# Patient Record
Sex: MALE | Race: WHITE | Employment: UNEMPLOYED | URBAN - METROPOLITAN AREA
[De-identification: names, ages, dates, MRNs, and addresses within clinical notes are randomized per-mention and may not be internally consistent; named-entity substitution may affect disease eponyms.]

---

## 2019-04-04 ENCOUNTER — HOSPITAL ENCOUNTER (OUTPATIENT)
Age: 59
Setting detail: OBSERVATION
Discharge: HOME | End: 2019-04-07
Attending: EMERGENCY MEDICINE | Admitting: HOSPITALIST
Payer: SELF-PAY

## 2019-04-04 ENCOUNTER — APPOINTMENT (OUTPATIENT)
Dept: CT IMAGING | Age: 59
End: 2019-04-04
Attending: EMERGENCY MEDICINE
Payer: SELF-PAY

## 2019-04-04 DIAGNOSIS — G93.40 ACUTE ENCEPHALOPATHY: Primary | ICD-10-CM

## 2019-04-04 LAB
ALBUMIN SERPL-MCNC: 3.1 G/DL (ref 3.5–5)
ALBUMIN/GLOB SERPL: 1 {RATIO} (ref 1.1–2.2)
ALP SERPL-CCNC: 52 U/L (ref 45–117)
ALT SERPL-CCNC: 16 U/L (ref 12–78)
ANION GAP SERPL CALC-SCNC: 6 MMOL/L (ref 5–15)
AST SERPL-CCNC: 16 U/L (ref 15–37)
BASOPHILS # BLD: 0 K/UL (ref 0–0.1)
BASOPHILS NFR BLD: 0 % (ref 0–1)
BILIRUB SERPL-MCNC: 0.3 MG/DL (ref 0.2–1)
BUN SERPL-MCNC: 22 MG/DL (ref 6–20)
BUN/CREAT SERPL: 15 (ref 12–20)
CALCIUM SERPL-MCNC: 8.3 MG/DL (ref 8.5–10.1)
CHLORIDE SERPL-SCNC: 102 MMOL/L (ref 97–108)
CK MB CFR SERPL CALC: ABNORMAL % (ref 0–2.5)
CK MB SERPL-MCNC: <1 NG/ML (ref 5–25)
CK SERPL-CCNC: 31 U/L (ref 39–308)
CO2 SERPL-SCNC: 28 MMOL/L (ref 21–32)
CREAT SERPL-MCNC: 1.51 MG/DL (ref 0.7–1.3)
DIFFERENTIAL METHOD BLD: NORMAL
EOSINOPHIL # BLD: 0.1 K/UL (ref 0–0.4)
EOSINOPHIL NFR BLD: 1 % (ref 0–7)
ERYTHROCYTE [DISTWIDTH] IN BLOOD BY AUTOMATED COUNT: 13 % (ref 11.5–14.5)
GLOBULIN SER CALC-MCNC: 3.2 G/DL (ref 2–4)
GLUCOSE SERPL-MCNC: 101 MG/DL (ref 65–100)
HCT VFR BLD AUTO: 43.9 % (ref 36.6–50.3)
HGB BLD-MCNC: 14.8 G/DL (ref 12.1–17)
IMM GRANULOCYTES # BLD AUTO: 0 K/UL (ref 0–0.04)
IMM GRANULOCYTES NFR BLD AUTO: 0 % (ref 0–0.5)
LYMPHOCYTES # BLD: 1.6 K/UL (ref 0.8–3.5)
LYMPHOCYTES NFR BLD: 19 % (ref 12–49)
MCH RBC QN AUTO: 32.1 PG (ref 26–34)
MCHC RBC AUTO-ENTMCNC: 33.7 G/DL (ref 30–36.5)
MCV RBC AUTO: 95.2 FL (ref 80–99)
MONOCYTES # BLD: 0.7 K/UL (ref 0–1)
MONOCYTES NFR BLD: 8 % (ref 5–13)
NEUTS SEG # BLD: 6.3 K/UL (ref 1.8–8)
NEUTS SEG NFR BLD: 72 % (ref 32–75)
NRBC # BLD: 0 K/UL (ref 0–0.01)
NRBC BLD-RTO: 0 PER 100 WBC
PLATELET # BLD AUTO: 189 K/UL (ref 150–400)
PMV BLD AUTO: 9.7 FL (ref 8.9–12.9)
POTASSIUM SERPL-SCNC: 3 MMOL/L (ref 3.5–5.1)
PROT SERPL-MCNC: 6.3 G/DL (ref 6.4–8.2)
RBC # BLD AUTO: 4.61 M/UL (ref 4.1–5.7)
SODIUM SERPL-SCNC: 136 MMOL/L (ref 136–145)
TROPONIN I SERPL-MCNC: <0.05 NG/ML
TSH SERPL DL<=0.05 MIU/L-ACNC: 0.85 UIU/ML (ref 0.36–3.74)
WBC # BLD AUTO: 8.6 K/UL (ref 4.1–11.1)

## 2019-04-04 PROCEDURE — 74011250636 HC RX REV CODE- 250/636: Performed by: EMERGENCY MEDICINE

## 2019-04-04 PROCEDURE — 83735 ASSAY OF MAGNESIUM: CPT

## 2019-04-04 PROCEDURE — 70450 CT HEAD/BRAIN W/O DYE: CPT

## 2019-04-04 PROCEDURE — 99284 EMERGENCY DEPT VISIT MOD MDM: CPT

## 2019-04-04 PROCEDURE — 96375 TX/PRO/DX INJ NEW DRUG ADDON: CPT

## 2019-04-04 PROCEDURE — 80053 COMPREHEN METABOLIC PANEL: CPT

## 2019-04-04 PROCEDURE — 82550 ASSAY OF CK (CPK): CPT

## 2019-04-04 PROCEDURE — 96374 THER/PROPH/DIAG INJ IV PUSH: CPT

## 2019-04-04 PROCEDURE — 99285 EMERGENCY DEPT VISIT HI MDM: CPT

## 2019-04-04 PROCEDURE — 85025 COMPLETE CBC W/AUTO DIFF WBC: CPT

## 2019-04-04 PROCEDURE — 74011250637 HC RX REV CODE- 250/637: Performed by: EMERGENCY MEDICINE

## 2019-04-04 PROCEDURE — 96361 HYDRATE IV INFUSION ADD-ON: CPT

## 2019-04-04 PROCEDURE — 84484 ASSAY OF TROPONIN QUANT: CPT

## 2019-04-04 PROCEDURE — 36415 COLL VENOUS BLD VENIPUNCTURE: CPT

## 2019-04-04 PROCEDURE — 84443 ASSAY THYROID STIM HORMONE: CPT

## 2019-04-04 RX ORDER — POTASSIUM CHLORIDE 750 MG/1
40 TABLET, FILM COATED, EXTENDED RELEASE ORAL
Status: COMPLETED | OUTPATIENT
Start: 2019-04-04 | End: 2019-04-04

## 2019-04-04 RX ORDER — DEXAMETHASONE SODIUM PHOSPHATE 4 MG/ML
10 INJECTION, SOLUTION INTRA-ARTICULAR; INTRALESIONAL; INTRAMUSCULAR; INTRAVENOUS; SOFT TISSUE
Status: COMPLETED | OUTPATIENT
Start: 2019-04-04 | End: 2019-04-04

## 2019-04-04 RX ORDER — SODIUM CHLORIDE 0.9 % (FLUSH) 0.9 %
5-40 SYRINGE (ML) INJECTION EVERY 8 HOURS
Status: DISCONTINUED | OUTPATIENT
Start: 2019-04-04 | End: 2019-04-07 | Stop reason: HOSPADM

## 2019-04-04 RX ORDER — ACETAMINOPHEN 500 MG
1000 TABLET ORAL ONCE
Status: COMPLETED | OUTPATIENT
Start: 2019-04-04 | End: 2019-04-04

## 2019-04-04 RX ORDER — SODIUM CHLORIDE 0.9 % (FLUSH) 0.9 %
5-40 SYRINGE (ML) INJECTION AS NEEDED
Status: DISCONTINUED | OUTPATIENT
Start: 2019-04-04 | End: 2019-04-07 | Stop reason: HOSPADM

## 2019-04-04 RX ORDER — METOCLOPRAMIDE HYDROCHLORIDE 5 MG/ML
10 INJECTION INTRAMUSCULAR; INTRAVENOUS
Status: COMPLETED | OUTPATIENT
Start: 2019-04-04 | End: 2019-04-04

## 2019-04-04 RX ADMIN — Medication 10 ML: at 22:00

## 2019-04-04 RX ADMIN — SODIUM CHLORIDE 1000 ML: 900 INJECTION, SOLUTION INTRAVENOUS at 23:17

## 2019-04-04 RX ADMIN — SODIUM CHLORIDE 1000 ML: 900 INJECTION, SOLUTION INTRAVENOUS at 20:54

## 2019-04-04 RX ADMIN — METOCLOPRAMIDE 10 MG: 5 INJECTION, SOLUTION INTRAMUSCULAR; INTRAVENOUS at 20:52

## 2019-04-04 RX ADMIN — ACETAMINOPHEN 1000 MG: 500 TABLET ORAL at 23:14

## 2019-04-04 RX ADMIN — POTASSIUM CHLORIDE 40 MEQ: 750 TABLET, EXTENDED RELEASE ORAL at 23:14

## 2019-04-04 RX ADMIN — DEXAMETHASONE SODIUM PHOSPHATE 10 MG: 4 INJECTION, SOLUTION INTRA-ARTICULAR; INTRALESIONAL; INTRAMUSCULAR; INTRAVENOUS; SOFT TISSUE at 20:56

## 2019-04-05 ENCOUNTER — APPOINTMENT (OUTPATIENT)
Dept: MRI IMAGING | Age: 59
End: 2019-04-05
Attending: HOSPITALIST
Payer: SELF-PAY

## 2019-04-05 PROBLEM — R51.9 HEADACHE: Status: ACTIVE | Noted: 2019-04-05

## 2019-04-05 PROBLEM — R42 DIZZINESS: Status: ACTIVE | Noted: 2019-04-05

## 2019-04-05 LAB
AMPHET UR QL SCN: NEGATIVE
ANION GAP SERPL CALC-SCNC: 4 MMOL/L (ref 5–15)
APPEARANCE UR: CLEAR
BACTERIA URNS QL MICRO: NEGATIVE /HPF
BARBITURATES UR QL SCN: NEGATIVE
BENZODIAZ UR QL: POSITIVE
BILIRUB UR QL: NEGATIVE
BUN SERPL-MCNC: 21 MG/DL (ref 6–20)
BUN/CREAT SERPL: 19 (ref 12–20)
CALCIUM SERPL-MCNC: 8.5 MG/DL (ref 8.5–10.1)
CANNABINOIDS UR QL SCN: POSITIVE
CHLORIDE SERPL-SCNC: 112 MMOL/L (ref 97–108)
CO2 SERPL-SCNC: 24 MMOL/L (ref 21–32)
COCAINE UR QL SCN: NEGATIVE
COLOR UR: ABNORMAL
CREAT SERPL-MCNC: 1.08 MG/DL (ref 0.7–1.3)
DRUG SCRN COMMENT,DRGCM: ABNORMAL
EPITH CASTS URNS QL MICRO: ABNORMAL /LPF
GLUCOSE SERPL-MCNC: 124 MG/DL (ref 65–100)
GLUCOSE UR STRIP.AUTO-MCNC: NEGATIVE MG/DL
HGB UR QL STRIP: NEGATIVE
KETONES UR QL STRIP.AUTO: ABNORMAL MG/DL
LEUKOCYTE ESTERASE UR QL STRIP.AUTO: NEGATIVE
MAGNESIUM SERPL-MCNC: 2 MG/DL (ref 1.6–2.4)
METHADONE UR QL: NEGATIVE
NITRITE UR QL STRIP.AUTO: NEGATIVE
OPIATES UR QL: NEGATIVE
PCP UR QL: NEGATIVE
PH UR STRIP: 7 [PH] (ref 5–8)
POTASSIUM SERPL-SCNC: 5.2 MMOL/L (ref 3.5–5.1)
PROT UR STRIP-MCNC: NEGATIVE MG/DL
RBC #/AREA URNS HPF: ABNORMAL /HPF (ref 0–5)
SODIUM SERPL-SCNC: 140 MMOL/L (ref 136–145)
SP GR UR REFRACTOMETRY: 1.01 (ref 1–1.03)
UA: UC IF INDICATED,UAUC: ABNORMAL
UROBILINOGEN UR QL STRIP.AUTO: 0.2 EU/DL (ref 0.2–1)
WBC URNS QL MICRO: ABNORMAL /HPF (ref 0–4)

## 2019-04-05 PROCEDURE — 96376 TX/PRO/DX INJ SAME DRUG ADON: CPT

## 2019-04-05 PROCEDURE — 97162 PT EVAL MOD COMPLEX 30 MIN: CPT

## 2019-04-05 PROCEDURE — 74011250636 HC RX REV CODE- 250/636: Performed by: HOSPITALIST

## 2019-04-05 PROCEDURE — 80307 DRUG TEST PRSMV CHEM ANLYZR: CPT

## 2019-04-05 PROCEDURE — 74011000250 HC RX REV CODE- 250: Performed by: HOSPITALIST

## 2019-04-05 PROCEDURE — 97116 GAIT TRAINING THERAPY: CPT

## 2019-04-05 PROCEDURE — 81001 URINALYSIS AUTO W/SCOPE: CPT

## 2019-04-05 PROCEDURE — 74011250637 HC RX REV CODE- 250/637: Performed by: HOSPITALIST

## 2019-04-05 PROCEDURE — 99218 HC RM OBSERVATION: CPT

## 2019-04-05 PROCEDURE — 36415 COLL VENOUS BLD VENIPUNCTURE: CPT

## 2019-04-05 PROCEDURE — A9575 INJ GADOTERATE MEGLUMI 0.1ML: HCPCS | Performed by: HOSPITALIST

## 2019-04-05 PROCEDURE — 97112 NEUROMUSCULAR REEDUCATION: CPT

## 2019-04-05 PROCEDURE — 97165 OT EVAL LOW COMPLEX 30 MIN: CPT

## 2019-04-05 PROCEDURE — 80048 BASIC METABOLIC PNL TOTAL CA: CPT

## 2019-04-05 PROCEDURE — 96375 TX/PRO/DX INJ NEW DRUG ADDON: CPT

## 2019-04-05 PROCEDURE — 70553 MRI BRAIN STEM W/O & W/DYE: CPT

## 2019-04-05 RX ORDER — POTASSIUM CHLORIDE AND SODIUM CHLORIDE 900; 300 MG/100ML; MG/100ML
INJECTION, SOLUTION INTRAVENOUS CONTINUOUS
Status: DISCONTINUED | OUTPATIENT
Start: 2019-04-05 | End: 2019-04-05

## 2019-04-05 RX ORDER — DIPHENHYDRAMINE HYDROCHLORIDE 50 MG/ML
25 INJECTION, SOLUTION INTRAMUSCULAR; INTRAVENOUS ONCE
Status: COMPLETED | OUTPATIENT
Start: 2019-04-05 | End: 2019-04-05

## 2019-04-05 RX ORDER — GADOTERATE MEGLUMINE 376.9 MG/ML
12 INJECTION INTRAVENOUS
Status: COMPLETED | OUTPATIENT
Start: 2019-04-05 | End: 2019-04-05

## 2019-04-05 RX ORDER — METOCLOPRAMIDE HYDROCHLORIDE 5 MG/ML
10 INJECTION INTRAMUSCULAR; INTRAVENOUS
Status: COMPLETED | OUTPATIENT
Start: 2019-04-05 | End: 2019-04-05

## 2019-04-05 RX ORDER — DIAZEPAM 5 MG/1
5 TABLET ORAL
COMMUNITY

## 2019-04-05 RX ORDER — QUETIAPINE FUMARATE 50 MG/1
50 TABLET, FILM COATED ORAL
COMMUNITY

## 2019-04-05 RX ORDER — ASPIRIN 325 MG/1
100 TABLET, FILM COATED ORAL DAILY
Status: DISCONTINUED | OUTPATIENT
Start: 2019-04-06 | End: 2019-04-07 | Stop reason: HOSPADM

## 2019-04-05 RX ORDER — AMOXICILLIN 250 MG/1
500 CAPSULE ORAL EVERY 8 HOURS
Status: DISCONTINUED | OUTPATIENT
Start: 2019-04-05 | End: 2019-04-07 | Stop reason: HOSPADM

## 2019-04-05 RX ORDER — LISINOPRIL 20 MG/1
20 TABLET ORAL DAILY
COMMUNITY
End: 2019-04-07

## 2019-04-05 RX ORDER — AMOXICILLIN 875 MG/1
875 TABLET, FILM COATED ORAL 2 TIMES DAILY
COMMUNITY

## 2019-04-05 RX ORDER — ENOXAPARIN SODIUM 100 MG/ML
40 INJECTION SUBCUTANEOUS EVERY 24 HOURS
Status: DISCONTINUED | OUTPATIENT
Start: 2019-04-05 | End: 2019-04-05

## 2019-04-05 RX ORDER — BISMUTH SUBSALICYLATE 262 MG
1 TABLET,CHEWABLE ORAL DAILY
COMMUNITY

## 2019-04-05 RX ORDER — HEPARIN SODIUM 5000 [USP'U]/ML
5000 INJECTION, SOLUTION INTRAVENOUS; SUBCUTANEOUS EVERY 12 HOURS
Status: DISCONTINUED | OUTPATIENT
Start: 2019-04-05 | End: 2019-04-07 | Stop reason: HOSPADM

## 2019-04-05 RX ORDER — ONDANSETRON 2 MG/ML
4 INJECTION INTRAMUSCULAR; INTRAVENOUS
Status: DISCONTINUED | OUTPATIENT
Start: 2019-04-05 | End: 2019-04-07 | Stop reason: HOSPADM

## 2019-04-05 RX ORDER — SODIUM CHLORIDE 0.9 % (FLUSH) 0.9 %
5-40 SYRINGE (ML) INJECTION EVERY 8 HOURS
Status: DISCONTINUED | OUTPATIENT
Start: 2019-04-05 | End: 2019-04-07 | Stop reason: HOSPADM

## 2019-04-05 RX ORDER — ACETAMINOPHEN 325 MG/1
650 TABLET ORAL
Status: DISCONTINUED | OUTPATIENT
Start: 2019-04-05 | End: 2019-04-06

## 2019-04-05 RX ORDER — METOPROLOL TARTRATE 25 MG/1
25 TABLET, FILM COATED ORAL EVERY 12 HOURS
Status: DISCONTINUED | OUTPATIENT
Start: 2019-04-05 | End: 2019-04-06

## 2019-04-05 RX ORDER — QUETIAPINE FUMARATE 25 MG/1
50 TABLET, FILM COATED ORAL
Status: DISCONTINUED | OUTPATIENT
Start: 2019-04-05 | End: 2019-04-07 | Stop reason: HOSPADM

## 2019-04-05 RX ORDER — MECLIZINE HCL 12.5 MG 12.5 MG/1
25 TABLET ORAL
Status: DISCONTINUED | OUTPATIENT
Start: 2019-04-05 | End: 2019-04-07 | Stop reason: HOSPADM

## 2019-04-05 RX ORDER — LABETALOL HCL 20 MG/4 ML
10 SYRINGE (ML) INTRAVENOUS
Status: DISCONTINUED | OUTPATIENT
Start: 2019-04-05 | End: 2019-04-05

## 2019-04-05 RX ORDER — POTASSIUM CHLORIDE 1.5 G/1.77G
40 POWDER, FOR SOLUTION ORAL
Status: COMPLETED | OUTPATIENT
Start: 2019-04-05 | End: 2019-04-05

## 2019-04-05 RX ORDER — HYDRALAZINE HYDROCHLORIDE 20 MG/ML
20 INJECTION INTRAMUSCULAR; INTRAVENOUS
Status: DISCONTINUED | OUTPATIENT
Start: 2019-04-05 | End: 2019-04-06

## 2019-04-05 RX ORDER — SODIUM CHLORIDE 0.9 % (FLUSH) 0.9 %
5-40 SYRINGE (ML) INJECTION AS NEEDED
Status: DISCONTINUED | OUTPATIENT
Start: 2019-04-05 | End: 2019-04-07 | Stop reason: HOSPADM

## 2019-04-05 RX ORDER — SODIUM CHLORIDE 9 MG/ML
75 INJECTION, SOLUTION INTRAVENOUS CONTINUOUS
Status: DISPENSED | OUTPATIENT
Start: 2019-04-05 | End: 2019-04-06

## 2019-04-05 RX ADMIN — Medication 10 ML: at 05:09

## 2019-04-05 RX ADMIN — HEPARIN SODIUM 5000 UNITS: 5000 INJECTION INTRAVENOUS; SUBCUTANEOUS at 21:42

## 2019-04-05 RX ADMIN — AMOXICILLIN 500 MG: 250 CAPSULE ORAL at 16:59

## 2019-04-05 RX ADMIN — Medication 10 ML: at 05:10

## 2019-04-05 RX ADMIN — GADOTERATE MEGLUMINE 12 ML: 376.9 INJECTION INTRAVENOUS at 19:00

## 2019-04-05 RX ADMIN — Medication 10 ML: at 15:58

## 2019-04-05 RX ADMIN — SODIUM CHLORIDE 100 ML/HR: 900 INJECTION, SOLUTION INTRAVENOUS at 16:00

## 2019-04-05 RX ADMIN — Medication 10 ML: at 19:50

## 2019-04-05 RX ADMIN — POTASSIUM CHLORIDE 40 MEQ: 1.5 POWDER, FOR SOLUTION ORAL at 03:47

## 2019-04-05 RX ADMIN — FOLIC ACID: 5 INJECTION, SOLUTION INTRAMUSCULAR; INTRAVENOUS; SUBCUTANEOUS at 02:54

## 2019-04-05 RX ADMIN — METOCLOPRAMIDE 10 MG: 5 INJECTION, SOLUTION INTRAMUSCULAR; INTRAVENOUS at 02:58

## 2019-04-05 RX ADMIN — QUETIAPINE FUMARATE 50 MG: 25 TABLET ORAL at 21:41

## 2019-04-05 RX ADMIN — ACETAMINOPHEN 650 MG: 325 TABLET ORAL at 08:21

## 2019-04-05 RX ADMIN — SODIUM CHLORIDE AND POTASSIUM CHLORIDE: 9; 2.98 INJECTION, SOLUTION INTRAVENOUS at 11:37

## 2019-04-05 RX ADMIN — Medication 10 ML: at 21:43

## 2019-04-05 RX ADMIN — Medication 10 ML: at 15:59

## 2019-04-05 RX ADMIN — SODIUM CHLORIDE AND POTASSIUM CHLORIDE: 9; 2.98 INJECTION, SOLUTION INTRAVENOUS at 03:49

## 2019-04-05 RX ADMIN — HEPARIN SODIUM 5000 UNITS: 5000 INJECTION INTRAVENOUS; SUBCUTANEOUS at 08:20

## 2019-04-05 RX ADMIN — AMOXICILLIN 500 MG: 250 CAPSULE ORAL at 21:46

## 2019-04-05 RX ADMIN — ACETAMINOPHEN 650 MG: 325 TABLET ORAL at 04:45

## 2019-04-05 RX ADMIN — DIPHENHYDRAMINE HYDROCHLORIDE 25 MG: 50 INJECTION, SOLUTION INTRAMUSCULAR; INTRAVENOUS at 01:55

## 2019-04-05 RX ADMIN — METOPROLOL TARTRATE 25 MG: 25 TABLET ORAL at 21:41

## 2019-04-05 RX ADMIN — ACETAMINOPHEN 650 MG: 325 TABLET ORAL at 21:42

## 2019-04-05 RX ADMIN — LABETALOL HYDROCHLORIDE 10 MG: 5 INJECTION, SOLUTION INTRAVENOUS at 15:58

## 2019-04-05 RX ADMIN — HYDRALAZINE HYDROCHLORIDE 20 MG: 20 INJECTION INTRAMUSCULAR; INTRAVENOUS at 19:50
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
## 2019-04-05 NOTE — ROUTINE PROCESS
Jovana Tinoco RN Registered Nurse Routine Process Signed Date of Service:  04/05/19 1940 []Hide copied text []Renetta for details Bedside and Verbal shift change report given to Gama Avendano (oncoming nurse) by Trudee Cheadle, RN (offgoing nurse). Report included the following information SBAR, Kardex, ED Summary, MAR and Recent Results. 
  
Zone Phone:   [phone]    
  
  
Significant changes during shift:  Elevated BP (given Labetalol), and MRI completed.  
  
  
  
Patient Information 
  
Haylee Davidson 
66 y.o. 
4/4/2019  8:24 PM by Isis White MD. Haylee Davidson was admitted from Home 
  
Problem List 
  
    
Patient Active Problem List  
  Diagnosis Date Noted  Dizziness 04/05/2019  
 Headache 04/05/2019  
  
No past medical history on file. Bipolar, HTN,  
  
  
Activity Status: 
  
OOB to Chair Yes Ambulated this shift Yes Bed Rest Yes 
  
  
LINES AND DRAINS: 20G R/L AC 
  
  
  
DVT prophylaxis:  Heparin 
  
DVT prophylaxis Med- Heparin DVT prophylaxis SCD or TAN- No  
  
Wounds: (If Applicable) 
  
Wounds- No 
  
Patient Safety: 
  
Falls Score Total Score: 2 Safety Level_______ Bed Alarm On? No 
Sitter? No 
  
Plan for upcoming shift: Monitor BP 
  
  
  
Discharge Plan: Yes / Possible for 4/6/19 if bp is controlled. Plan would be home with H/H and PT. 
  
Active Consults: 
None

## 2019-04-05 NOTE — ED NOTES
Pt's brother, Toni Quiñonez, [phone] if anything is needed. He will call in the morning for update.
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
## 2019-04-05 NOTE — ROUTINE PROCESS
TRANSFER - IN REPORT: 
 
Verbal report received from Nichelle(name) jan Chow  being received from ED(unit) for routine progression of care Report consisted of patients Situation, Background, Assessment and  
Recommendations(SBAR). Information from the following report(s) SBAR, Kardex, ED Summary, [facility] and Recent Results was reviewed with the receiving nurse. Opportunity for questions and clarification was provided. Assessment completed upon patients arrival to unit and care assumed.

## 2019-04-05 NOTE — PROGRESS NOTES
Pt seen and examined. Brother and SUZAN at bed side. Plan of care discussed with patient and family. Subjective Symptoms improved since admission, but have not completely resolved. Pt says he has been having blurry vision along with dizziness Objective /88, P 98 RR 18 sats 100% RA Not orthostatic Plan 
-Pt was diagnosed with ear infection and on amoxicillin for 2 days, will restart 
-will do MRI head to r/o stroke 
-had JOSE on admission, in setting of recently started lisinopril, will hold ACE and started pt on lopressor ( doesn't have insurance, medication affordability can be an issue) -UDS positive for THC, benzodiazepine( on prescription valium?) 
-resume seroquel PTA med 
-had hypokalemia on admission and repeat potassium 5.2 (d/marizol fluid with potassium).  Recheck lytes in am 
 
Rest Agree with A/P as stated in HPI

## 2019-04-05 NOTE — ED NOTES
Forensic nurse Kris Claudio will be coming to [facility] ED to speak with patient.
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
## 2019-04-05 NOTE — ROUTINE PROCESS
Bedside and Verbal shift change report given to Bettye (oncoming nurse) by Nasrin Pearson RN (offgoing nurse). Report included the following information SBAR, Kardex, ED Summary, [facility] and Recent Results. Zone Phone:   [phone] Significant changes during shift:  Admit to [facility] Patient Information Chary Yañez 
66 y.o. 
4/4/2019  8:24 PM by Esther Snyder MD. Chary Yañez was admitted from Home 
 
Problem List 
 
Patient Active Problem List  
 Diagnosis Date Noted  Dizziness 04/05/2019  
 Headache 04/05/2019 No past medical history on file. Bipolar, HTN, Activity Status: OOB to Chair Yes Ambulated this shift Yes Bed Rest Yes LINES AND DRAINS: 
  
 
DVT prophylaxis: DVT prophylaxis Med- No 
DVT prophylaxis SCD or TAN- No  
 
Wounds: (If Applicable) Wounds- No 
 
Patient Safety: 
 
Falls Score Total Score: 2 Safety Level_______ Bed Alarm On? No 
Sitter? No 
 
Plan for upcoming shift:  
 
 
Discharge Plan: Yes TBD Active Consults: 
None

## 2019-04-05 NOTE — ROUTINE PROCESS
-Please complete MRI History and Safety Screening Form for this patient using KARDEX only under Orders Requiring a Screening Form: 
 

## 2019-04-05 NOTE — ED NOTES
TRANSFER - OUT REPORT: 
 
Verbal report given to France Leyva RN(name) on Ankita Fischer  being transferred to [facility](unit) for routine progression of care Report consisted of patients Situation, Background, Assessment and  
Recommendations(SBAR). Information from the following report(s) SBAR, ED Summary, [facility] and Recent Results was reviewed with the receiving nurse. Lines:  
Peripheral IV 04/04/19 Left Antecubital (Active) Site Assessment Clean, dry, & intact 4/4/2019  8:35 PM  
Phlebitis Assessment 0 4/4/2019  8:35 PM  
Infiltration Assessment 0 4/4/2019  8:35 PM  
Dressing Status Clean, dry, & intact 4/4/2019  8:35 PM  
Dressing Type Transparent 4/4/2019  8:35 PM  
Hub Color/Line Status Pink 4/4/2019  8:35 PM  
  
 
Opportunity for questions and clarification was provided. Patient transported with: 
 [email or web address]

## 2019-04-05 NOTE — PROGRESS NOTES
Orders received, chart reviewed and patient evaluated by occupational therapy. Recommend patient to discharge to home with home care PT pending progression with skilled acute occupational therapy. Recommend with nursing patient to complete as able in order to maintain strength, endurance and independence: OOB to chair 3x/day, ADLs with supervision/setup and mobilizing to the bathroom for toileting with 1 assist. Thank you for your assistance. Full evaluation to follow.

## 2019-04-05 NOTE — ED TRIAGE NOTES
Assumed care of pt. Pt states he thinks he was given too many blood pressure pills. EMS reported pt had BP of 77/50 when picked up, EMS gave 250 mL of NS. Pt complains of severe headache. Pt reports he has bipolar disorder and was off his meds until recently.
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
## 2019-04-05 NOTE — PROGRESS NOTES
Pharmacy  Heparin Monitoring Indication: DVT prophylaxis Current Dose: Enoxaparin 40 mg subcutaneously every 24 hours Creatinine Clearance (mL/min): 47.5 ml/min Labs: 
Recent Labs 04/04/19 2056 CREA 1.51* HGB 14.8  Wt Readings from Last 1 Encounters:  
04/04/19 56.7 kg (125 lb) Ht Readings from Last 1 Encounters:  
04/04/19 165.1 cm (65\") Impression/Plan:  
Change to heparin 5000 units subcutaneously every 12hr per protocol for low weight patients <60 kg Thanks, Moisés Cee, PHARMD

## 2019-04-05 NOTE — PROGRESS NOTES
Orders received, chart reviewed and patient evaluated by physical therapy. Recommend patient to discharge to home with HHPT pending progress with skilled acute care physical therapy. Recommend with nursing patient to complete as able in order to maintain strength, endurance and independence: OOB to chair 3x/day with 1 person and ambulating with SBA. Thank you for your assistance. Full evaluation to follow.
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
## 2019-04-05 NOTE — PROGRESS NOTES
Occupational Therapy EVALUATION/discharge Patient: Luan South (43 y.o. male) Date: 4/5/2019 Primary Diagnosis: Dizziness [R42] Precautions:   Fall ASSESSMENT:  
Based on the objective data described below, the patient presents close to his baseline. Pt stated that he was independent with ADLs and ILS and did not use AD. HE was cleared to be seen for therapy  And all VSS and orthostatics taken and BP was stable. Pt scored 66/66 on Fugl Ligia and he did have minimal tremor in right UE but stated that he did not notice and tremor appeared only when he was standing. Pt was independent with bed mobility, transfers, and ADLs and stated that he was feeling much better and more like baseline. Pt was left sitting up in chair with call bell with in reach. Recommend that pt return home and have home care PT. Further skilled acute occupational therapy is not indicated at this time. Discharge Recommendations: Home Health PT Further Equipment Recommendations for Discharge: tbd SUBJECTIVE:  
Patient stated I just was afraid that I was having a stroke so I came in. Britni Sutton OBJECTIVE DATA SUMMARY:  
HISTORY:  
No past medical history on file. No past surgical history on file. Prior Level of Function/Environment/Context: pt lives with room mate, and stated that he was independent with ADLs and ILS Expanded or extensive additional review of patient history:  
 
Home Situation Home Environment: Private residence # Steps to Enter: 5 Rails to Enter: Yes Hand Rails : Right One/Two Story Residence: One story Living Alone: No 
Support Systems: Friends \ neighbors Current DME Used/Available at Home: None Tub or Shower Type: Tub/Shower combination Hand dominance: Right EXAMINATION OF PERFORMANCE DEFICITS: 
Cognitive/Behavioral Status: 
  
  
 intact Skin: in good health Edema: none Hearing: Auditory Auditory Impairment: None Vision/Perceptual:   
    
    
    
 intact Range of Motion: 
 
AROM: Within functional limits PROM: Within functional limits Strength: 
 
Strength: Within functional limits Coordination: 
Coordination: Within functional limits Tone & Sensation: 
Tone: Normal 
Sensation: Intact Balance: 
Sitting: Intact Standing: Impaired Standing - Static: Good Standing - Dynamic : Fair Functional Mobility and Transfers for ADLs:  
 
Transfers: 
Sit to Stand: Independent Stand to Sit: Independent ADL Assessment: 
  
Pt is setup with ADLs in this setting. Functional Measure: 
Fugl-Murray Assessment of Motor Recovery after Stroke:  
 
Reflex Activity Flexors/Biceps/Fingers: Can be elicited Extensors/Triceps: Can be elicited Reflex Subtotal: 4 Volitional Movement Within Synergies Shoulder Retraction: Full Shoulder Elevation: Full Shoulder Abduction (90 degrees): Full Shoulder External Rotation: Full Elbow Flexion: Full Forearm Supination: Full Shoulder Adduction/Internal Rotation: Full Elbow Extension: Full Forearm Pronation: Full Subtotal: 18 
 
Volitional Movement Mixing Synergies Hand to Lumbar Spine: Full Shoulder Flexion (0-90 degrees): Full Pronation-Supination: Full Subtotal: 6 Volitional Movement With Little or No Synergy Shoulder Abduction (0-90 degrees): Full Shoulder Flexion ( degrees): Full Pronation/Supination: Full Subtotal : 6 Normal Reflex Activity Biceps, Triceps, Finger Flexors: Full Subtotal : 2 Upper Extremity Total  
Upper Extremity Total: 36 Wrist 
Stability at 15 Degree Dorsiflexion: Full Repeated Dorsiflexion/ Volar Flexion: Full Stability at 15 Degree Dorsiflexion: Full Repeated Dorsiflexion/ Volar Flexion: Full Circumduction: Full Wrist Total: 10 Hand Mass Flexion: Full Mass Extension: Full Grasp A: Full Grasp B: Full Grasp C: Full Grasp D: Full Grasp E: Full Hand Total: 14 
 
Coordination/Speed Tremor: None Dysmetria: None Time: <1s Coordination/Speed Total : 6 Total A-D Total A-D (Motor Function): 83/76 This is a reliable/valid measure of arm function after a neurological event. It has established value to characterize functional status and for measuring spontaneous and therapy-induced recovery; tests proximal and distal motor functions. Fugl-Murray Assessment  UE scores recorded between five and 30 days post neurologic event can be used to predict UE recovery at six months post neurologic event. Severe = 0-21 points Moderately Severe = 22-33 points Moderate = 34-47 points Mild = 48-66 points Bobbye Mcburney Matchar, D., BERYL Celis, & SHAINA Hernandez (1992). Measurement of motor recovery after stroke: Outcome assessment and sample size requirements. Stroke, 23, pp. 1735-7821.  
--------------------------------------------------------------------------------------------------------------------------------------------------------------------MCID: 
Stroke: Diana Le et al, 2001; n = 171; mean age 79 (6) years; assessed within 16 (12) days of stroke, Acute Stroke) FMA Motor Scores from Admission to Discharge 10 point increase in FMA Upper Extremity = 1.5 change in discharge FIM  
10 point increase in FMA Lower Extremity = 1.9 change in discharge FIM 
MDC:  
Stroke:  
Nani Harris et al, 2008, n = 14, mean age = 59.9 (14.6) years, assessed on average 14 (6.5) months post stroke, Chronic Stroke) FMA = 5.2 points for the Upper Extremity portion of the assessment Occupational Therapy Evaluation Charge Determination History Examination Decision-Making LOW Complexity : Brief history review  LOW Complexity : 1-3 performance deficits relating to physical, cognitive , or psychosocial skils that result in activity limitations and / or participation restrictions  LOW Complexity : No comorbidities that affect functional and no verbal or physical assistance needed to complete eval tasks Based on the above components, the patient evaluation is determined to be of the following complexity level: LOW Pain: 
Pain Scale 1: Numeric (0 - 10) Pain Intensity 1: 0 Pain Location 1: Head 
  
Pain Description 1: Aching;Dull Pain Intervention(s) 1: Medication (see MAR) Activity Tolerance:  
fair Please refer to the flowsheet for vital signs taken during this treatment. After treatment:  
[x]  Patient left in no apparent distress sitting up in chair 
[]  Patient left in no apparent distress in bed 
[x]  Call bell left within reach [x]  Nursing notified 
[]  Caregiver present [x]  Bed alarm activated COMMUNICATION/EDUCATION:  
Communication/Collaboration: 
[x]      Home safety education was provided and the patient/caregiver indicated understanding. [x]      Patient/family have participated as able and agree with findings and recommendations. []      Patient is unable to participate in plan of care at this time. Findings and recommendations were discussed with: Physical Therapist and Registered Nurse YO Cesar Time Calculation: 36 mins

## 2019-04-05 NOTE — PROGRESS NOTES
Pharmacy Clarification of the Prior to Admission Medication Regimen Retrospective to the Admission Medication Reconciliation The patient was interviewed regarding clarification of the prior to admission medication regimen and was questioned regarding use of any other inhalers, topical products, over the counter medications, herbal medications, vitamin products or ophthalmic/nasal/otic medication use. Information Obtained From: Patient Recommendations/Findings: The following amendments were made to the patient's active medication list on file at [facility]:  
 
1) Additions: QUEtiapine (SEROQUEL) 50 mg tablet 
amoxicillin (AMOXIL) 875 mg tablet 
diazePAM (VALIUM) 5 mg tablet 
docosahexanoic acid/epa (FISH OIL PO) 
lisinopril (PRINIVIL, ZESTRIL) 20 mg tablet 
multivitamin (ONE A DAY) tablet 2) Removals: None 3) Changes: None 4) Pertinent Pharmacy Findings: 
[id number] 
diazePAM (VALIUM) 5 mg tablet:  Patient stated in interview that he knows that this agent is prescribed daily but some days he will take 2 or 3 depending on his anxiety. Patient also stated in interview that he did smoke marijuana 2 to 3 times a day but stopped nearly a month ago, but some still \"might show up in my pee\". PTA medication list was corrected to the following:  
 
Prior to Admission Medications Prescriptions Last Dose Informant Patient Reported? Taking? QUEtiapine (SEROQUEL) 50 mg tablet 4/4/2019 at Unknown time Self Yes Yes Sig: Take 50 mg by mouth nightly. amoxicillin (AMOXIL) 875 mg tablet 4/4/2019 at Unknown time Self Yes Yes Sig: Take 875 mg by mouth two (2) times a day. diazePAM (VALIUM) 5 mg tablet 4/4/2019 at Unknown time Self Yes Yes Sig: Take 5 mg by mouth daily as needed for Anxiety. docosahexanoic acid/epa (FISH OIL PO) 4/4/2019 at Unknown time Self Yes Yes Sig: Take 1 Tab by mouth daily. lisinopril (PRINIVIL, ZESTRIL) 20 mg tablet 4/4/2019 at Unknown time Self Yes Yes Sig: Take 20 mg by mouth daily. multivitamin (ONE A DAY) tablet 4/4/2019 at Unknown time Self Yes Yes Sig: Take 1 Tab by mouth daily. Facility-Administered Medications: None Thank you, 
Talat Catching Medication History Pharmacy Technician

## 2019-04-05 NOTE — ED NOTES
Pt ambulated to restroom with family. CT notified that pt is back in room and ready to go to CT now.
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
## 2019-04-05 NOTE — PROGRESS NOTES
Reason for Admission:  Dizziness RRAT Score: 4 Plan for utilizing home health: Pt will need HH at discharge, however he is a self pay patient. He would need hh acceptance as a diandra pt. [facility] has denied due to psych hx. Current Advanced Directive/Advance Care Plan: No plan on file Likelihood of Readmission:  Low based on RRAT Transition of Care Plan:   CM met with the pt to discuss d/c planning needs. Pt brother and ex wife were at bedside. Pt rents a room in a 1 story home (5 stairs to entry). Pt states that prior to hospitalization he was independent with ADLs/IADLs. Pt states that he does not drive but can uber or rely on friends for transport. Pt is self pay and was provided with a [employer] application. PT/OT recommend HH at discharge. Referrals sent to [facility], which was denied and [facility] who accepted. CM has completd the d/c planning needs of the pt at this time. Care Management Interventions PCP Verified by CM: Yes Mode of Transport at Discharge: (Pt family will transport home ) Transition of Care Consult (CM Consult): Discharge Planning Physical Therapy Consult: Yes Occupational Therapy Consult: Yes Current Support Network: Other(Rents a room in a home ) Confirm Follow Up Transport: Family Plan discussed with Pt/Family/Caregiver: Yes Discharge Location Discharge Placement: Home with home health Nathanael Poon, Care Manager [phone]

## 2019-04-06 LAB
ANION GAP SERPL CALC-SCNC: 4 MMOL/L (ref 5–15)
BUN SERPL-MCNC: 20 MG/DL (ref 6–20)
BUN/CREAT SERPL: 24 (ref 12–20)
CALCIUM SERPL-MCNC: 8.2 MG/DL (ref 8.5–10.1)
CHLORIDE SERPL-SCNC: 113 MMOL/L (ref 97–108)
CHOLEST SERPL-MCNC: 145 MG/DL
CO2 SERPL-SCNC: 25 MMOL/L (ref 21–32)
CREAT SERPL-MCNC: 0.83 MG/DL (ref 0.7–1.3)
GLUCOSE SERPL-MCNC: 93 MG/DL (ref 65–100)
HDLC SERPL-MCNC: 49 MG/DL
HDLC SERPL: 3 {RATIO} (ref 0–5)
LDLC SERPL CALC-MCNC: 78.8 MG/DL (ref 0–100)
LIPID PROFILE,FLP: NORMAL
POTASSIUM SERPL-SCNC: 3.6 MMOL/L (ref 3.5–5.1)
SODIUM SERPL-SCNC: 142 MMOL/L (ref 136–145)
TRIGL SERPL-MCNC: 86 MG/DL (ref ?–150)
VLDLC SERPL CALC-MCNC: 17.2 MG/DL

## 2019-04-06 PROCEDURE — 36415 COLL VENOUS BLD VENIPUNCTURE: CPT

## 2019-04-06 PROCEDURE — 80061 LIPID PANEL: CPT

## 2019-04-06 PROCEDURE — 74011250636 HC RX REV CODE- 250/636: Performed by: INTERNAL MEDICINE

## 2019-04-06 PROCEDURE — 74011250637 HC RX REV CODE- 250/637: Performed by: HOSPITALIST

## 2019-04-06 PROCEDURE — 74011250636 HC RX REV CODE- 250/636: Performed by: HOSPITALIST

## 2019-04-06 PROCEDURE — 99218 HC RM OBSERVATION: CPT

## 2019-04-06 PROCEDURE — 80048 BASIC METABOLIC PNL TOTAL CA: CPT

## 2019-04-06 PROCEDURE — 74011250637 HC RX REV CODE- 250/637: Performed by: INTERNAL MEDICINE

## 2019-04-06 RX ORDER — HYDRALAZINE HYDROCHLORIDE 20 MG/ML
10 INJECTION INTRAMUSCULAR; INTRAVENOUS
Status: DISCONTINUED | OUTPATIENT
Start: 2019-04-06 | End: 2019-04-07 | Stop reason: HOSPADM

## 2019-04-06 RX ORDER — METOPROLOL TARTRATE 50 MG/1
50 TABLET ORAL EVERY 12 HOURS
Status: DISCONTINUED | OUTPATIENT
Start: 2019-04-06 | End: 2019-04-07 | Stop reason: HOSPADM

## 2019-04-06 RX ORDER — ACETAMINOPHEN 500 MG
500 TABLET ORAL
Status: DISCONTINUED | OUTPATIENT
Start: 2019-04-06 | End: 2019-04-07 | Stop reason: HOSPADM

## 2019-04-06 RX ORDER — HYDRALAZINE HYDROCHLORIDE 20 MG/ML
20 INJECTION INTRAMUSCULAR; INTRAVENOUS ONCE
Status: DISCONTINUED | OUTPATIENT
Start: 2019-04-06 | End: 2019-04-06

## 2019-04-06 RX ORDER — BUTALBITAL, ACETAMINOPHEN AND CAFFEINE 50; 325; 40 MG/1; MG/1; MG/1
1 TABLET ORAL
Status: DISCONTINUED | OUTPATIENT
Start: 2019-04-06 | End: 2019-04-07 | Stop reason: HOSPADM

## 2019-04-06 RX ADMIN — AMOXICILLIN 500 MG: 250 CAPSULE ORAL at 13:38

## 2019-04-06 RX ADMIN — METOPROLOL TARTRATE 25 MG: 25 TABLET ORAL at 08:22

## 2019-04-06 RX ADMIN — ACETAMINOPHEN 650 MG: 325 TABLET ORAL at 08:18

## 2019-04-06 RX ADMIN — AMOXICILLIN 500 MG: 250 CAPSULE ORAL at 08:18

## 2019-04-06 RX ADMIN — Medication 10 ML: at 22:06

## 2019-04-06 RX ADMIN — HEPARIN SODIUM 5000 UNITS: 5000 INJECTION INTRAVENOUS; SUBCUTANEOUS at 08:19

## 2019-04-06 RX ADMIN — AMOXICILLIN 500 MG: 250 CAPSULE ORAL at 22:06

## 2019-04-06 RX ADMIN — Medication 10 ML: at 13:38

## 2019-04-06 RX ADMIN — QUETIAPINE FUMARATE 50 MG: 25 TABLET ORAL at 22:06

## 2019-04-06 RX ADMIN — BUTALBITAL, ACETAMINOPHEN AND CAFFEINE 1 TABLET: 50; 325; 40 TABLET ORAL at 12:02

## 2019-04-06 RX ADMIN — BUTALBITAL, ACETAMINOPHEN AND CAFFEINE 1 TABLET: 50; 325; 40 TABLET ORAL at 18:03

## 2019-04-06 RX ADMIN — Medication 100 MG: at 08:18

## 2019-04-06 RX ADMIN — HYDRALAZINE HYDROCHLORIDE 10 MG: 20 INJECTION INTRAMUSCULAR; INTRAVENOUS at 16:09

## 2019-04-06 RX ADMIN — Medication 10 ML: at 16:10

## 2019-04-06 RX ADMIN — METOPROLOL TARTRATE 50 MG: 50 TABLET ORAL at 22:06

## 2019-04-06 NOTE — ROUTINE PROCESS
Bedside and Verbal shift change report given to Bettye (oncoming nurse) by Kimmie Calvin (offgoing nurse). Report included the following information SBAR, Kardex, ED Summary, [facility] and Recent Results. Zone Phone:   [phone] Significant changes during shift:  Admit to [facility] Patient Information Quinton Blue 
66 y.o. 
4/4/2019  8:24 PM by April Brothers MD. Quinton Blue was admitted from Home 
 
Problem List 
 
Patient Active Problem List  
 Diagnosis Date Noted  Dizziness 04/05/2019  
 Headache 04/05/2019 No past medical history on file. Bipolar, HTN, Activity Status: OOB to Chair Yes Ambulated this shift Yes Bed Rest Yes LINES AND DRAINS: 
  
 
DVT prophylaxis: DVT prophylaxis Med- No 
DVT prophylaxis SCD or TAN- No  
 
Wounds: (If Applicable) Wounds- No 
 
Patient Safety: 
 
Falls Score Total Score: 1 Safety Level_______ Bed Alarm On? No 
Sitter? No 
 
Plan for upcoming shift:  
 
 
Discharge Plan: Yes TBD Active Consults: 
None

## 2019-04-06 NOTE — PROGRESS NOTES
Hospitalist Progress Note NAME: Roxanna Khalil :  1960 MRN:  [id number] Assessment / Plan: 
Dizziness Headache 
-likely due to severe hypotension with SBP~70s on admission as per EMS 
-CT head neg for acute process   
-MRI with normal study 
-dizziness improved but has HA today, likely due to elevated BP 
-start Fioricet prn, tylenol prn. Control BP 
 
HTN,  was initially hypotensive 
-hold ACEi due to JOSE  
-stable on metoprolol, incr dose for better BP control JOSE 
-resolved with ACEI on hold 
-monitor bmp Hypokalemia 
-repleted, wnl today 
   
Bipolar disorder 
-cont' seroquel 
  
Unclear history of alcohol use 
-cont' thiamine 
-reports last drink was >4 days ago 
  
Marijuana abuse, counseled on cessation 
   
Ear infection 
-cont' PTA amoxicillin Code status: full DVT ppx: sq heparin Disposition: TBD Subjective: Chief Complaint / Reason for Physician Visit Pt seen in bed, c/o HA but denies dizziness or vision change. States his BP is causing the headache. Tylenol did not helped. Discussed with RN events overnight. Review of Systems: 
Symptom Y/N Comments  Symptom Y/N Comments Fever/Chills n   Chest Pain n   
Poor Appetite    Edema Cough    Abdominal Pain Sputum    Joint Pain SOB/MÉNDEZ n   Pruritis/Rash Nausea/vomit    Tolerating PT/OT Diarrhea    Tolerating Diet Constipation    Other y HA Could NOT obtain due to:   
 
Objective: VITALS:  
Last 24hrs VS reviewed since prior progress note. Most recent are: 
Patient Vitals for the past 24 hrs: 
 Temp Pulse Resp BP SpO2  
19 1057 97.8 °F (36.6 °C) 83 18 (!) 164/99 98 % 19 0634 98 °F (36.7 °C) 78 20 146/86 99 % 19 0309 97.4 °F (36.3 °C) 77 20 136/78 100 % 19 2255 98.1 °F (36.7 °C) 89 18 109/69 99 % 19 2141  (!) 110  (!) 145/93   
19 1938 97.9 °F (36.6 °C) 95 18 (!) 192/94 95 % 19 1543 97.5 °F (36.4 °C) 100 18 (!) 187/107 100 % No intake or output data in the 24 hours ending 04/06/19 1318 PHYSICAL EXAM: 
General: WD, WN. Alert, cooperative, no acute distress   
EENT:  EOMI. Anicteric sclerae. MMM Resp:  CTA bilaterally, no wheezing or rales. No accessory muscle use CV:  Regular  rhythm,  No edema GI:  Soft, Non distended, Non tender.  +Bowel sounds Neurologic:  Alert and oriented X 3, normal speech, Psych:   Good insight. Not anxious nor agitated Skin:  No rashes. No jaundice Reviewed most current lab test results and cultures  YES Reviewed most current radiology test results   YES Review and summation of old records today    NO Reviewed patient's current orders and MAR    YES 
PMH/SH reviewed - no change compared to H&P 
________________________________________________________________________ Care Plan discussed with: 
  Comments Patient x Family RN x Care Manager Consultant Multidiciplinary team rounds were held today with , nursing, pharmacist and clinical coordinator. Patient's plan of care was discussed; medications were reviewed and discharge planning was addressed. ________________________________________________________________________ Total NON critical care TIME:  35   Minutes Total CRITICAL CARE TIME Spent:   Minutes non procedure based Comments >50% of visit spent in counseling and coordination of care    
________________________________________________________________________ Elizabeth Lowe MD  
 
Procedures: see electronic medical records for all procedures/Xrays and details which were not copied into this note but were reviewed prior to creation of Plan. LABS: 
I reviewed today's most current labs and imaging studies. Pertinent labs include: 
Recent Labs 04/04/19 2056 WBC 8.6 HGB 14.8 HCT 43.9  Recent Labs 04/06/19 
0426 04/05/19 
1045 04/04/19 2056  140 136  
K 3.6 5.2* 3.0*  
* 112* 102 CO2 25 24 28 GLU 93 124* 101* BUN 20 21* 22* CREA 0.83 1.08 1.51* CA 8.2* 8.5 8.3*  
MG  --   --  2.0 ALB  --   --  3.1* TBILI  --   --  0.3 SGOT  --   --  16 ALT  --   --  16 Signed: Wai Dorado MD

## 2019-04-06 NOTE — PROGRESS NOTES
Bedside and Verbal shift change report given to [address]) by Aaron Tirado RN (offgoing nurse). Report included the following information SBAR, Kardex, ED Summary, MAR and Recent Results. 
  
Zone Phone:   [phone]    
  
  
Significant changes during shift:  none,  
  
  
  
Patient Information 
  
David Romo 
66 y.o. 
4/4/2019  8:24 PM by Nakita Wilkerson MD. Nathan Miranda was admitted from Home 
  
Problem List 
  
       
Patient Active Problem List  
  Diagnosis Date Noted  Dizziness 04/05/2019  
 Headache 04/05/2019  
  
No past medical history on file. Bipolar, HTN,  
  
  
Activity Status: 
  
OOB to [facility] Ambulated this shift Yes  
Bed Rest Yes 
  
  
LINES AND DRAINS: 20G R/L AC 
  
  
  
DVT prophylaxis:  Heparin 
  
DVT prophylaxis Med- Heparin DVT prophylaxis SCD or TAN- No  
  
Wounds: (If Applicable) 
  
Wounds- No 
  
Patient Safety: 
  
Falls Score Total Score: 2 Safety Level_______ Bed Alarm On? No 
Sitter? No 
  
Plan for upcoming shift: Monitor BP 
  
  
  
Discharge Plan: Yes / Possible for 4/7/19 if bp is controlled.   Plan would be home with H/H and PT. 
  
Active Consults: 
None

## 2019-04-07 VITALS
WEIGHT: 125 LBS | TEMPERATURE: 98 F | OXYGEN SATURATION: 98 % | DIASTOLIC BLOOD PRESSURE: 68 MMHG | SYSTOLIC BLOOD PRESSURE: 159 MMHG | HEART RATE: 95 BPM | RESPIRATION RATE: 16 BRPM | BODY MASS INDEX: 20.83 KG/M2 | HEIGHT: 65 IN

## 2019-04-07 PROCEDURE — 99218 HC RM OBSERVATION: CPT

## 2019-04-07 PROCEDURE — 74011250636 HC RX REV CODE- 250/636: Performed by: INTERNAL MEDICINE

## 2019-04-07 PROCEDURE — 74011250637 HC RX REV CODE- 250/637: Performed by: INTERNAL MEDICINE

## 2019-04-07 PROCEDURE — 74011250637 HC RX REV CODE- 250/637: Performed by: HOSPITALIST

## 2019-04-07 RX ORDER — MECLIZINE HYDROCHLORIDE 25 MG/1
25 TABLET ORAL
Qty: 30 TAB | Refills: 0 | Status: SHIPPED | OUTPATIENT
Start: 2019-04-07 | End: 2019-04-17

## 2019-04-07 RX ORDER — BUTALBITAL, ACETAMINOPHEN AND CAFFEINE 50; 325; 40 MG/1; MG/1; MG/1
1 TABLET ORAL
Qty: 5 TAB | Refills: 0 | Status: SHIPPED | OUTPATIENT
Start: 2019-04-07

## 2019-04-07 RX ORDER — METOPROLOL TARTRATE 50 MG/1
50 TABLET ORAL EVERY 12 HOURS
Qty: 60 TAB | Refills: 0 | Status: SHIPPED | OUTPATIENT
Start: 2019-04-07

## 2019-04-07 RX ADMIN — Medication 10 ML: at 06:01

## 2019-04-07 RX ADMIN — METOPROLOL TARTRATE 50 MG: 50 TABLET ORAL at 09:00

## 2019-04-07 RX ADMIN — HYDRALAZINE HYDROCHLORIDE 10 MG: 20 INJECTION INTRAMUSCULAR; INTRAVENOUS at 06:00

## 2019-04-07 RX ADMIN — Medication 20 ML: at 06:00

## 2019-04-07 RX ADMIN — ACETAMINOPHEN 500 MG: 500 TABLET ORAL at 07:03

## 2019-04-07 RX ADMIN — Medication 100 MG: at 08:59

## 2019-04-07 RX ADMIN — AMOXICILLIN 500 MG: 250 CAPSULE ORAL at 06:01

## 2019-04-07 RX ADMIN — BUTALBITAL, ACETAMINOPHEN AND CAFFEINE 1 TABLET: 50; 325; 40 TABLET ORAL at 06:21
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
## 2019-04-07 NOTE — PROGRESS NOTES
Bedside and Verbal shift change report given to Dorian Alarcon RN (oncoming nurse) by Miles Chavarria RN (offgoing nurse). Report included the following information SBAR, Kardex, Intake/Output, MAR and Cardiac Rhythm NSR.

## 2019-04-07 NOTE — ROUTINE PROCESS
Weekend CM reviewed medical record, followed up re: transitional care plans. Patient medically cleared for discharge today 4/7/19. Discharge plan remains for Patient to return home, family to transport. Patient accepted by [facility], CM notified agency of scheduled discharge today. As requested, CM provided education and resource information re: community resources including free/low-cost medical care and Medicaid application. No additional questions/concerns reported/identified. CM remains available to further assist with any additional case management needs as indicated. Felix Garcia, LCSW Luisa Salcedo

## 2019-04-08 NOTE — PROGRESS NOTES
Reviewed discharge instructions, medications, home health, and follow up appointment with patient and family. Patient and family verbalized understanding and questions were answered. PIV D/C per protocol. Patient taken to car via wheelchair.